# Patient Record
Sex: MALE | Race: WHITE | NOT HISPANIC OR LATINO | URBAN - METROPOLITAN AREA
[De-identification: names, ages, dates, MRNs, and addresses within clinical notes are randomized per-mention and may not be internally consistent; named-entity substitution may affect disease eponyms.]

---

## 2021-06-23 ENCOUNTER — OUTPATIENT (OUTPATIENT)
Dept: OUTPATIENT SERVICES | Facility: HOSPITAL | Age: 76
LOS: 1 days | End: 2021-06-23

## 2021-12-20 ENCOUNTER — OUTPATIENT (OUTPATIENT)
Dept: OUTPATIENT SERVICES | Facility: HOSPITAL | Age: 76
LOS: 1 days | End: 2021-12-20

## 2022-10-14 PROBLEM — Z00.00 ENCOUNTER FOR PREVENTIVE HEALTH EXAMINATION: Status: ACTIVE | Noted: 2022-10-14

## 2022-10-18 ENCOUNTER — NON-APPOINTMENT (OUTPATIENT)
Age: 77
End: 2022-10-18

## 2022-10-18 ENCOUNTER — APPOINTMENT (OUTPATIENT)
Dept: CARDIOLOGY | Facility: CLINIC | Age: 77
End: 2022-10-18
Payer: MEDICARE

## 2022-10-18 VITALS
WEIGHT: 198 LBS | OXYGEN SATURATION: 98 % | DIASTOLIC BLOOD PRESSURE: 56 MMHG | BODY MASS INDEX: 29.33 KG/M2 | SYSTOLIC BLOOD PRESSURE: 176 MMHG | HEART RATE: 56 BPM | TEMPERATURE: 98.4 F | HEIGHT: 69 IN

## 2022-10-18 VITALS — SYSTOLIC BLOOD PRESSURE: 164 MMHG | DIASTOLIC BLOOD PRESSURE: 60 MMHG

## 2022-10-18 DIAGNOSIS — Z87.898 PERSONAL HISTORY OF OTHER SPECIFIED CONDITIONS: ICD-10-CM

## 2022-10-18 DIAGNOSIS — Z78.9 OTHER SPECIFIED HEALTH STATUS: ICD-10-CM

## 2022-10-18 DIAGNOSIS — Z87.891 PERSONAL HISTORY OF NICOTINE DEPENDENCE: ICD-10-CM

## 2022-10-18 DIAGNOSIS — Z80.42 FAMILY HISTORY OF MALIGNANT NEOPLASM OF PROSTATE: ICD-10-CM

## 2022-10-18 DIAGNOSIS — F41.9 ANXIETY DISORDER, UNSPECIFIED: ICD-10-CM

## 2022-10-18 PROCEDURE — 99204 OFFICE O/P NEW MOD 45 MIN: CPT | Mod: 25

## 2022-10-18 PROCEDURE — 93242 EXT ECG>48HR<7D RECORDING: CPT | Mod: 59

## 2022-10-18 PROCEDURE — 93000 ELECTROCARDIOGRAM COMPLETE: CPT | Mod: 59

## 2022-10-28 ENCOUNTER — APPOINTMENT (OUTPATIENT)
Dept: CARDIOLOGY | Facility: CLINIC | Age: 77
End: 2022-10-28

## 2022-10-28 PROCEDURE — 93244 EXT ECG>48HR<7D REV&INTERPJ: CPT

## 2022-10-28 NOTE — HISTORY OF PRESENT ILLNESS
[FreeTextEntry1] : ESTRELLITA THOMAS  is a 77 year old  M\par \par Initial cardiology consultation. Referred by primary physician.\par Underwent remote evaluation for heart murmur. \par Now retired on the [employer]. \par \par History of longstanding hypertension. Recently his blood pressure has been more elevated. Losartan was added. When his blood pressure is elevated he feels warm with throbbing headache. He has had minor symptoms of edema on amlodipine. There is a reactive component to his blood pressure. At home his blood pressure is well controlled. \par There is a history of vasovagal syncope. \par He reports his heart rate is always low. Typically 40s.\par \par There is no prior history of a clinical myocardial infarction, coronary revascularization. \par There is no history of symptomatic congestive heart failure\par There is no history of symptomatic arrhythmias including atrial fibrillation.\par \par There is no exertional chest pain, pressure or discomfort. \par There is no significant dyspnea on exertion or orthopnea. \par There are no symptomatic palpitations, lightheadedness, dizziness or syncope.\par \par  HDL 39 creatinine 1.0 hemoglobin 15.7 \par

## 2022-10-28 NOTE — ASSESSMENT
[FreeTextEntry1] : Heart murmur. Hypertension. Bradycardia. \par Recommend echocardiogram, exercise test, monitor, carotid. \par Reviewed long-term blood pressure goals.\par  Return to office with blood pressure monitor & log. \par Reviewed indications for permanent pacemaker. \par Low-sodium diet. \par Long-term would benefit from lipid-lowering therapy. \par

## 2022-11-07 ENCOUNTER — NON-APPOINTMENT (OUTPATIENT)
Age: 77
End: 2022-11-07

## 2022-11-07 ENCOUNTER — APPOINTMENT (OUTPATIENT)
Dept: CARDIOLOGY | Facility: CLINIC | Age: 77
End: 2022-11-07

## 2022-11-07 PROCEDURE — 93979 VASCULAR STUDY: CPT

## 2022-11-07 PROCEDURE — 93306 TTE W/DOPPLER COMPLETE: CPT

## 2022-11-07 PROCEDURE — 93880 EXTRACRANIAL BILAT STUDY: CPT

## 2022-11-10 ENCOUNTER — APPOINTMENT (OUTPATIENT)
Dept: CARDIOLOGY | Facility: CLINIC | Age: 77
End: 2022-11-10

## 2022-11-10 PROCEDURE — 93015 CV STRESS TEST SUPVJ I&R: CPT

## 2022-11-15 ENCOUNTER — APPOINTMENT (OUTPATIENT)
Dept: CARDIOLOGY | Facility: CLINIC | Age: 77
End: 2022-11-15

## 2022-11-15 ENCOUNTER — RESULT REVIEW (OUTPATIENT)
Age: 77
End: 2022-11-15

## 2022-11-15 VITALS
SYSTOLIC BLOOD PRESSURE: 136 MMHG | HEART RATE: 57 BPM | HEIGHT: 69 IN | DIASTOLIC BLOOD PRESSURE: 68 MMHG | TEMPERATURE: 98 F | WEIGHT: 198 LBS | OXYGEN SATURATION: 98 % | BODY MASS INDEX: 29.33 KG/M2

## 2022-11-15 DIAGNOSIS — R01.1 CARDIAC MURMUR, UNSPECIFIED: ICD-10-CM

## 2022-11-15 DIAGNOSIS — E04.1 NONTOXIC SINGLE THYROID NODULE: ICD-10-CM

## 2022-11-15 DIAGNOSIS — R55 SYNCOPE AND COLLAPSE: ICD-10-CM

## 2022-11-15 PROCEDURE — 99214 OFFICE O/P EST MOD 30 MIN: CPT

## 2022-11-15 NOTE — HISTORY OF PRESENT ILLNESS
[FreeTextEntry1] : ESTRELLITA THOMAS  is a 77 year old  M\par \par Referred by primary physician.\par Underwent remote evaluation for heart murmur. \par Now retired on the [employer] [place]. Previously lived in [place]. \par Goes boating, fishes on beach has good QOL\par \par History of longstanding hypertension. Recently his blood pressure has been more elevated. Losartan was added. When his blood pressure is elevated he feels warm with throbbing headache. He has had minor symptoms of edema on amlodipine. There is a reactive component to his blood pressure. At home his blood pressure is well controlled. \par There is a history of vasovagal syncope. \par He reports his heart rate is always low. Typically 40s.\par \par There is no prior history of a clinical myocardial infarction, coronary revascularization. \par There is no history of symptomatic congestive heart failure\par There is no history of symptomatic arrhythmias including atrial fibrillation.\par \par There is no exertional chest pain, pressure or discomfort. \par There is no significant dyspnea on exertion or orthopnea. \par There are no symptomatic palpitations, lightheadedness, dizziness or syncope.\par \par Home BPs 110s/60s. Takes losartan and amlodipine in AM and feels very fatigued after. \par Reports longstanding white coat HTN\par \par October 2022  HDL 39 creatinine 1.0 hemoglobin 15.7 \par Abdominal ultrasound 11/2022 dilated proximal aorta and moderate plaque, max diameter 3 cm\par Carotid Doppler 11/2022 Mild to moderate nonobstructive atherosclerosis bilaterally.  Left thyroid nodule. \par Exercise stress test 6 minutes 40 seconds resting /78, peak /70. 6min 40sec. No ischemia by EKG. \par Echo 11/2022 normal LV systolic function trace MR\par Event monitor SB avg 53 bpm nonsustained ectopy noted\par

## 2022-11-15 NOTE — ASSESSMENT
[FreeTextEntry1] : ESTRELLITA THOMAS is a 77 year old M who presents today Nov 15, 2022 with the above history and the following active issues: \par \par Heart murmur. \par Trace MR, normal EF. \par Surveillance monitoring \par \par HTN\par Well controlled at home. Note reactive, white coat HTN. \par No HHD on echo\par Cont losartan 100mg in AM and switch amlodpine 5mg to nighttime given fatigue. \par Low-sodium diet. \par \par Longstanding bradycardia\par Hx vasovagal syncope\par SB on monitor avg 50s will avoid AVN blockers\par Reviewed red flag symptoms which would warrant emergent medical evaluation. \par \par Mild to mod carotid and ao atherosclerosis \par AAA 3cm\par BP control as above. \par Long-term would benefit from lipid-lowering therapy. \par \par HLD\par Start atrova 20mg daily along with diet and lifestyle modification measures\par Check labs in 2-3 mo. \par Call if AEs\par \par No ischemic heart disease on ETT\par Risk factor modification, prevention of CVD complications emphasized. \par Any questions and concerns were addressed and resolved. \par \par Sincerely,\par \par IJEOMA Hernandez\par Patients history, testing, and plan reviewed with supervising MD: Dr. Rudolph Soni \par \par \par \par Sincerely,\par \par FLOYD HernandezC\par Patients history, testing, and plan reviewed with supervising MD: Dr. Rudolph Soni

## 2022-11-15 NOTE — ADDENDUM
[FreeTextEntry1] : Please note the patient was reviewed with NP Chari Treviño.\par I was physically present during the service of the patient.\par I was directly involved in the management plan and recommendations of the care provided to the patient. \par I personally reviewed the history and physical examination as documented by the NP above.\par \par

## 2022-11-17 ENCOUNTER — APPOINTMENT (OUTPATIENT)
Dept: ULTRASOUND IMAGING | Facility: CLINIC | Age: 77
End: 2022-11-17

## 2022-11-17 PROCEDURE — 76536 US EXAM OF HEAD AND NECK: CPT

## 2022-11-21 ENCOUNTER — NON-APPOINTMENT (OUTPATIENT)
Age: 77
End: 2022-11-21

## 2023-02-07 ENCOUNTER — APPOINTMENT (OUTPATIENT)
Dept: CARDIOLOGY | Facility: CLINIC | Age: 78
End: 2023-02-07
Payer: MEDICARE

## 2023-02-07 VITALS
OXYGEN SATURATION: 98 % | SYSTOLIC BLOOD PRESSURE: 146 MMHG | BODY MASS INDEX: 28.29 KG/M2 | HEART RATE: 59 BPM | TEMPERATURE: 98.2 F | WEIGHT: 191 LBS | HEIGHT: 69 IN | DIASTOLIC BLOOD PRESSURE: 66 MMHG

## 2023-02-07 PROCEDURE — 99214 OFFICE O/P EST MOD 30 MIN: CPT

## 2023-02-07 NOTE — ASSESSMENT
[FreeTextEntry1] : ESTRELLITA THOMAS is a 77 year old M who presents today Nov 15, 2022 with the above history and the following active issues: \par \par Heart murmur. \par Trace MR, normal EF. \par Surveillance monitoring \par \par HTN\par There is reactive, white coat HTN. \par No HHD on echo\par There are SBP readings <100 and fatigue since pt has intentionally lost weight. \par Cont losartan 100mg in AM and  may reduce amlodipine 2.5mg to nighttime\par If cont weight loss/fatigue he will call me to further discuss. Cont home monitoring. \par Low-sodium diet. \par \par Longstanding bradycardia\par Hx vasovagal syncope\par SB on monitor avg 50s will avoid AVN blockers\par Reviewed red flag symptoms which would warrant emergent medical evaluation. \par \par Mild to mod carotid and ao atherosclerosis \par AAA 3cm\par BP control as above. \par Long-term would benefit from lipid-lowering therapy. \par \par HLD\par Doing well on atorva 20mg daily along with diet and lifestyle modification measures\par LDL from 124 to 51, no AEs so cont current dosing\par \par No ischemic heart disease on ETT\par Risk factor modification, prevention of CVD complications emphasized. \par Any questions and concerns were addressed and resolved. \par \par Sincerely,\par \par IJEOMA Montano\par Patients history, testing, and plan reviewed with supervising MD: Dr. Rudolph Soni

## 2023-02-07 NOTE — ADDENDUM
[FreeTextEntry1] : Please note the patient was reviewed with NP Chari [place].\par I was physically present during the service of the patient.\par I was directly involved in the management plan and recommendations of the care provided to the patient. \par I personally reviewed the history and physical examination as documented by the NP above.\par \par

## 2023-02-07 NOTE — HISTORY OF PRESENT ILLNESS
[FreeTextEntry1] : ESTRELLITA THOMAS  is a 77 year old  M\par \par Referred by primary physician.\par Underwent remote evaluation for heart murmur. \par Now retired on the [employer] [place]. Previously lived in [place]. \par Goes boating, fishes on beach has good QOL\par \par History of longstanding hypertension. Recently his blood pressure has been more elevated. Losartan was added. When his blood pressure is elevated he feels warm with throbbing headache. He has had minor symptoms of edema on amlodipine. There is a reactive component to his blood pressure. At home his blood pressure is well controlled. \par There is a history of vasovagal syncope. \par He reports his heart rate is always low. Typically 40s.\par \par There is no prior history of a clinical myocardial infarction, coronary revascularization. \par There is no history of symptomatic congestive heart failure\par There is no history of symptomatic arrhythmias including atrial fibrillation.\par \par There is no exertional chest pain, pressure or discomfort. \par There is no significant dyspnea on exertion or orthopnea. \par There are no symptomatic palpitations, lightheadedness, dizziness or syncope.\par \par Home BPs 110s/60s. However there are some readings SBP <100. Takes losartan AM and norvasc in PM. He feels very fatigued lately. He intentionally lost weight, about 7 lbs. \par Reports longstanding white coat HTN\par \par October 2022  HDL 39 creatinine 1.0 hemoglobin 15.7 started on statin --> \par Labs 2/2023 LDL 51, LFTs wnl, k 5, cr 1.1\par \par Abdominal ultrasound 11/2022 dilated proximal aorta and moderate plaque, max diameter 3 cm\par Carotid Doppler 11/2022 Mild to moderate nonobstructive atherosclerosis bilaterally.  Left thyroid nodule. \par Exercise stress test 6 minutes 40 seconds resting /78, peak /70. 6min 40sec. No ischemia by EKG. \par Echo 11/2022 normal LV systolic function trace MR\par Event monitor SB avg 53 bpm nonsustained ectopy noted\par thyroid US scanned shows some very small nodules/cysts which are not suspicious

## 2023-10-10 ENCOUNTER — NON-APPOINTMENT (OUTPATIENT)
Age: 78
End: 2023-10-10

## 2023-10-10 ENCOUNTER — APPOINTMENT (OUTPATIENT)
Dept: CARDIOLOGY | Facility: CLINIC | Age: 78
End: 2023-10-10
Payer: MEDICARE

## 2023-10-10 VITALS
HEART RATE: 58 BPM | HEIGHT: 69 IN | OXYGEN SATURATION: 97 % | BODY MASS INDEX: 27.85 KG/M2 | DIASTOLIC BLOOD PRESSURE: 88 MMHG | SYSTOLIC BLOOD PRESSURE: 178 MMHG | WEIGHT: 188 LBS

## 2023-10-10 LAB — LDLC SERPL DIRECT ASSAY-MCNC: 51

## 2023-10-10 PROCEDURE — 99214 OFFICE O/P EST MOD 30 MIN: CPT

## 2023-10-10 RX ORDER — ATORVASTATIN CALCIUM 20 MG/1
20 TABLET, FILM COATED ORAL
Qty: 90 | Refills: 3 | Status: DISCONTINUED | COMMUNITY
Start: 2022-11-15 | End: 2023-10-10

## 2023-10-11 RX ORDER — AMLODIPINE BESYLATE 2.5 MG/1
2.5 TABLET ORAL DAILY
Qty: 90 | Refills: 3 | Status: ACTIVE | COMMUNITY
Start: 2022-09-06 | End: 1900-01-01

## 2023-10-20 ENCOUNTER — TRANSCRIPTION ENCOUNTER (OUTPATIENT)
Age: 78
End: 2023-10-20

## 2023-11-27 LAB — HBA1C MFR BLD HPLC: 5.3

## 2023-11-28 ENCOUNTER — APPOINTMENT (OUTPATIENT)
Dept: CARDIOLOGY | Facility: CLINIC | Age: 78
End: 2023-11-28
Payer: MEDICARE

## 2023-11-28 VITALS
DIASTOLIC BLOOD PRESSURE: 72 MMHG | HEIGHT: 69 IN | WEIGHT: 186 LBS | SYSTOLIC BLOOD PRESSURE: 150 MMHG | HEART RATE: 52 BPM | OXYGEN SATURATION: 99 % | BODY MASS INDEX: 27.55 KG/M2

## 2023-11-28 DIAGNOSIS — I10 ESSENTIAL (PRIMARY) HYPERTENSION: ICD-10-CM

## 2023-11-28 DIAGNOSIS — I49.1 ATRIAL PREMATURE DEPOLARIZATION: ICD-10-CM

## 2023-11-28 DIAGNOSIS — R00.1 BRADYCARDIA, UNSPECIFIED: ICD-10-CM

## 2023-11-28 DIAGNOSIS — I71.40 ABDOMINAL AORTIC ANEURYSM, WITHOUT RUPTURE, UNSPECIFIED: ICD-10-CM

## 2023-11-28 DIAGNOSIS — E78.2 MIXED HYPERLIPIDEMIA: ICD-10-CM

## 2023-11-28 DIAGNOSIS — I65.23 OCCLUSION AND STENOSIS OF BILATERAL CAROTID ARTERIES: ICD-10-CM

## 2023-11-28 PROCEDURE — 93979 VASCULAR STUDY: CPT

## 2023-11-28 PROCEDURE — 99214 OFFICE O/P EST MOD 30 MIN: CPT

## 2023-11-28 RX ORDER — ALPRAZOLAM 0.25 MG/1
0.25 TABLET ORAL DAILY
Refills: 0 | Status: DISCONTINUED | COMMUNITY
End: 2023-11-28

## 2023-12-07 ENCOUNTER — RX RENEWAL (OUTPATIENT)
Age: 78
End: 2023-12-07

## 2023-12-08 RX ORDER — LOSARTAN POTASSIUM 50 MG/1
50 TABLET, FILM COATED ORAL
Qty: 90 | Refills: 3 | Status: ACTIVE | COMMUNITY
Start: 2022-10-13 | End: 1900-01-01

## 2024-09-24 ENCOUNTER — NON-APPOINTMENT (OUTPATIENT)
Age: 79
End: 2024-09-24

## 2024-09-24 ENCOUNTER — APPOINTMENT (OUTPATIENT)
Dept: CARDIOLOGY | Facility: CLINIC | Age: 79
End: 2024-09-24
Payer: MEDICARE

## 2024-09-24 VITALS
WEIGHT: 190 LBS | BODY MASS INDEX: 28.06 KG/M2 | DIASTOLIC BLOOD PRESSURE: 82 MMHG | OXYGEN SATURATION: 96 % | SYSTOLIC BLOOD PRESSURE: 110 MMHG | HEART RATE: 49 BPM

## 2024-09-24 DIAGNOSIS — R01.1 CARDIAC MURMUR, UNSPECIFIED: ICD-10-CM

## 2024-09-24 DIAGNOSIS — R00.1 BRADYCARDIA, UNSPECIFIED: ICD-10-CM

## 2024-09-24 DIAGNOSIS — I71.40 ABDOMINAL AORTIC ANEURYSM, WITHOUT RUPTURE, UNSPECIFIED: ICD-10-CM

## 2024-09-24 DIAGNOSIS — I65.23 OCCLUSION AND STENOSIS OF BILATERAL CAROTID ARTERIES: ICD-10-CM

## 2024-09-24 DIAGNOSIS — I49.1 ATRIAL PREMATURE DEPOLARIZATION: ICD-10-CM

## 2024-09-24 DIAGNOSIS — E78.2 MIXED HYPERLIPIDEMIA: ICD-10-CM

## 2024-09-24 DIAGNOSIS — I10 ESSENTIAL (PRIMARY) HYPERTENSION: ICD-10-CM

## 2024-09-24 DIAGNOSIS — I25.10 ATHEROSCLEROTIC HEART DISEASE OF NATIVE CORONARY ARTERY W/OUT ANGINA PECTORIS: ICD-10-CM

## 2024-09-24 PROCEDURE — 93246 EXT ECG>7D<15D RECORDING: CPT

## 2024-09-24 PROCEDURE — 93000 ELECTROCARDIOGRAM COMPLETE: CPT | Mod: 59

## 2024-09-24 PROCEDURE — 99214 OFFICE O/P EST MOD 30 MIN: CPT

## 2024-09-25 NOTE — HISTORY OF PRESENT ILLNESS
[FreeTextEntry1] : ESTRELLITA THOMAS  is a 78 year old  M  Referred by primary physician d/t low HR Underwent remote evaluation for heart murmur.  Now retired on the [place] [place]. Previously lived in [place].  Goes boating, fishes on beach has good QOL  History of longstanding hypertension. Recently his blood pressure has been more elevated. Losartan was added. When his blood pressure is elevated he feels warm with throbbing headache. He has had minor symptoms of edema on amlodipine. There is a reactive component to his blood pressure. At home his blood pressure is well controlled.  There is a history of vasovagal syncope.  He reports his heart rate is always low. Typically 40s.  There is no prior history of a clinical myocardial infarction, coronary revascularization.  There is no history of symptomatic congestive heart failure There is no history of symptomatic arrhythmias including atrial fibrillation.  There is no exertional chest pain, pressure or discomfort.  There is no significant dyspnea on exertion or orthopnea.  There are no symptomatic palpitations, lightheadedness, dizziness or syncope.  there is a history of baseline bradycardia.   There is a history of reactive hypertension.  His home blood pressures are within normal limits.    He felt unwell on the statin.  He does not recall the symptoms.  He stopped taking the medication in May 2023 His LDL off med is 73, but has low HDL ~30s  Most recently underwent THR with improved functional status. He fishes on the [facility] and walked up 105 steps to from the beach without exertional complaints.  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  Left thyroid nodule.  Exercise stress test 6 minutes 40 seconds resting /78, peak /70. 6min 40sec. No ischemia by EKG.  Echo 11/2022 normal LV systolic function trace MR Event monitor SB avg 53 bpm nonsustained ectopy noted thyroid US scanned shows some very small nodules/cysts which are not suspicious

## 2024-09-25 NOTE — HISTORY OF PRESENT ILLNESS
[FreeTextEntry1] : ESTRELLITA THOMAS  is a 78 year old  M  Referred by primary physician d/t low HR Underwent remote evaluation for heart murmur.  Now retired on the [place] [employer] [place]. Previously lived in [place].  Goes boating, fishes on beach has good QOL  History of longstanding hypertension. Recently his blood pressure has been more elevated. Losartan was added. When his blood pressure is elevated he feels warm with throbbing headache. He has had minor symptoms of edema on amlodipine. There is a reactive component to his blood pressure. At home his blood pressure is well controlled.  There is a history of vasovagal syncope.  He reports his heart rate is always low. Typically 40s.  There is no prior history of a clinical myocardial infarction, coronary revascularization.  There is no history of symptomatic congestive heart failure There is no history of symptomatic arrhythmias including atrial fibrillation.  There is no exertional chest pain, pressure or discomfort.  There is no significant dyspnea on exertion or orthopnea.  There are no symptomatic palpitations, lightheadedness, dizziness or syncope.  there is a history of baseline bradycardia.   There is a history of reactive hypertension.  His home blood pressures are within normal limits.    He felt unwell on the statin.  He does not recall the symptoms.  He stopped taking the medication in May 2023 His LDL off med is 73, but has low HDL ~30s  Most recently underwent THR with improved functional status. He fishes on the [facility] and walked up 105 steps to from the beach without exertional complaints.  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  Left thyroid nodule.  Exercise stress test 6 minutes 40 seconds resting /78, peak /70. 6min 40sec. No ischemia by EKG.  Echo 11/2022 normal LV systolic function trace MR Event monitor SB avg 53 bpm nonsustained ectopy noted thyroid US scanned shows some very small nodules/cysts which are not suspicious

## 2024-09-25 NOTE — ASSESSMENT
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 Trace MR, normal EF. Surveillance monitoring  HTN There is reactive, white coat HTN. No HHD on echo Cont losartan 100mg in AM and amlodipine 2.5mg to nighttime home monitoring shows BP at goal <130/80 Low-sodium diet.  Longstanding bradycardia Hx vasovagal syncope avoid AVN blockers  Mild to mod carotid and ao atherosclerosis AAA 3cm on prior not seen on more recent US  BP control as above. Long-term would benefit from lipid-lowering therapy.  HLD low HDL side effects on lowest possible dose of atorva 5-10mg declines further lipid lowering rx at this time repeat labs diet and lifestyle modification measures  Followup after labs/monitor.  If any symptoms occur he is aware to call us right away for further eval.   Sincerely,  IJEOMA Montano Patients history, testing, and plan reviewed with supervising MD: Dr. Rudolph Soni

## 2024-09-25 NOTE — ASSESSMENT
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 Trace MR, normal EF. Surveillance monitoring  HTN There is reactive, white coat HTN. No HHD on echo Cont losartan 100mg in AM and amlodipine 2.5mg to nighttime home monitoring shows BP at goal <130/80 Low-sodium diet.  Longstanding bradycardia Hx vasovagal syncope avoid AVN blockers  Mild to mod carotid and ao atherosclerosis AAA 3cm on prior not seen on more recent US  BP control as above. Long-term would benefit from lipid-lowering therapy.  HLD low HDL side effects on lowest possible dose of atorva 5-10mg declines further lipid lowering rx at this time repeat labs diet and lifestyle modification measures  Followup after labs/monitor.  If any symptoms occur he is aware to call us right away for further eval.   Sincerely,  IJEOMA Montano Patients history, testing, and plan reviewed with supervising MD: Dr. Rudolph Soni

## 2024-10-09 DIAGNOSIS — I48.0 PAROXYSMAL ATRIAL FIBRILLATION: ICD-10-CM

## 2024-10-09 PROCEDURE — 93248 EXT ECG>7D<15D REV&INTERPJ: CPT

## 2024-10-15 RX ORDER — APIXABAN 5 MG/1
5 TABLET, FILM COATED ORAL
Qty: 180 | Refills: 1 | Status: DISCONTINUED | COMMUNITY
Start: 2024-10-09 | End: 2024-10-15

## 2024-10-15 RX ORDER — RIVAROXABAN 20 MG/1
20 TABLET, FILM COATED ORAL
Qty: 90 | Refills: 1 | Status: ACTIVE | COMMUNITY
Start: 2024-10-15 | End: 1900-01-01

## 2024-11-06 ENCOUNTER — APPOINTMENT (OUTPATIENT)
Dept: ELECTROPHYSIOLOGY | Facility: CLINIC | Age: 79
End: 2024-11-06

## 2024-11-06 VITALS
HEART RATE: 40 BPM | BODY MASS INDEX: 27.25 KG/M2 | WEIGHT: 184 LBS | SYSTOLIC BLOOD PRESSURE: 154 MMHG | HEIGHT: 69 IN | DIASTOLIC BLOOD PRESSURE: 62 MMHG | OXYGEN SATURATION: 96 %

## 2024-11-06 DIAGNOSIS — R00.1 BRADYCARDIA, UNSPECIFIED: ICD-10-CM

## 2024-11-06 DIAGNOSIS — I48.0 PAROXYSMAL ATRIAL FIBRILLATION: ICD-10-CM

## 2024-11-06 PROCEDURE — 93242 EXT ECG>48HR<7D RECORDING: CPT

## 2024-11-06 PROCEDURE — 99214 OFFICE O/P EST MOD 30 MIN: CPT

## 2024-11-06 PROCEDURE — 93000 ELECTROCARDIOGRAM COMPLETE: CPT

## 2024-11-06 RX ORDER — APIXABAN 5 MG/1
5 TABLET, FILM COATED ORAL
Refills: 0 | Status: ACTIVE | COMMUNITY

## 2024-11-13 ENCOUNTER — NON-APPOINTMENT (OUTPATIENT)
Age: 79
End: 2024-11-13

## 2024-12-03 ENCOUNTER — APPOINTMENT (OUTPATIENT)
Dept: CARDIOLOGY | Facility: CLINIC | Age: 79
End: 2024-12-03
Payer: MEDICARE

## 2024-12-03 VITALS
WEIGHT: 183 LBS | DIASTOLIC BLOOD PRESSURE: 60 MMHG | OXYGEN SATURATION: 99 % | HEIGHT: 69 IN | SYSTOLIC BLOOD PRESSURE: 140 MMHG | HEART RATE: 49 BPM | BODY MASS INDEX: 27.11 KG/M2

## 2024-12-03 DIAGNOSIS — I48.0 PAROXYSMAL ATRIAL FIBRILLATION: ICD-10-CM

## 2024-12-03 DIAGNOSIS — I25.10 ATHEROSCLEROTIC HEART DISEASE OF NATIVE CORONARY ARTERY W/OUT ANGINA PECTORIS: ICD-10-CM

## 2024-12-03 DIAGNOSIS — I49.1 ATRIAL PREMATURE DEPOLARIZATION: ICD-10-CM

## 2024-12-03 DIAGNOSIS — I10 ESSENTIAL (PRIMARY) HYPERTENSION: ICD-10-CM

## 2024-12-03 DIAGNOSIS — R00.1 BRADYCARDIA, UNSPECIFIED: ICD-10-CM

## 2024-12-03 DIAGNOSIS — E78.2 MIXED HYPERLIPIDEMIA: ICD-10-CM

## 2024-12-03 DIAGNOSIS — R01.1 CARDIAC MURMUR, UNSPECIFIED: ICD-10-CM

## 2024-12-03 DIAGNOSIS — I71.40 ABDOMINAL AORTIC ANEURYSM, WITHOUT RUPTURE, UNSPECIFIED: ICD-10-CM

## 2024-12-03 PROCEDURE — 99214 OFFICE O/P EST MOD 30 MIN: CPT

## 2024-12-03 RX ORDER — ALPRAZOLAM 2 MG/1
TABLET ORAL
Refills: 0 | Status: ACTIVE | COMMUNITY

## 2024-12-25 PROBLEM — F10.90 ALCOHOL USE: Status: ACTIVE | Noted: 2022-10-18

## 2025-01-22 PROCEDURE — 93244 EXT ECG>48HR<7D REV&INTERPJ: CPT

## 2025-01-31 ENCOUNTER — APPOINTMENT (OUTPATIENT)
Dept: CARDIOLOGY | Facility: CLINIC | Age: 80
End: 2025-01-31
Payer: MEDICARE

## 2025-01-31 VITALS
BODY MASS INDEX: 28.65 KG/M2 | OXYGEN SATURATION: 99 % | DIASTOLIC BLOOD PRESSURE: 82 MMHG | WEIGHT: 194 LBS | HEART RATE: 47 BPM | SYSTOLIC BLOOD PRESSURE: 162 MMHG

## 2025-01-31 DIAGNOSIS — I25.10 ATHEROSCLEROTIC HEART DISEASE OF NATIVE CORONARY ARTERY W/OUT ANGINA PECTORIS: ICD-10-CM

## 2025-01-31 DIAGNOSIS — I10 ESSENTIAL (PRIMARY) HYPERTENSION: ICD-10-CM

## 2025-01-31 DIAGNOSIS — I48.0 PAROXYSMAL ATRIAL FIBRILLATION: ICD-10-CM

## 2025-01-31 DIAGNOSIS — E78.2 MIXED HYPERLIPIDEMIA: ICD-10-CM

## 2025-01-31 DIAGNOSIS — R00.1 BRADYCARDIA, UNSPECIFIED: ICD-10-CM

## 2025-01-31 DIAGNOSIS — I71.40 ABDOMINAL AORTIC ANEURYSM, WITHOUT RUPTURE, UNSPECIFIED: ICD-10-CM

## 2025-01-31 DIAGNOSIS — R01.1 CARDIAC MURMUR, UNSPECIFIED: ICD-10-CM

## 2025-01-31 DIAGNOSIS — I65.23 OCCLUSION AND STENOSIS OF BILATERAL CAROTID ARTERIES: ICD-10-CM

## 2025-01-31 PROCEDURE — 99214 OFFICE O/P EST MOD 30 MIN: CPT

## 2025-01-31 PROCEDURE — 93306 TTE W/DOPPLER COMPLETE: CPT

## 2025-02-24 ENCOUNTER — APPOINTMENT (OUTPATIENT)
Dept: CARDIOLOGY | Facility: CLINIC | Age: 80
End: 2025-02-24

## 2025-03-04 ENCOUNTER — APPOINTMENT (OUTPATIENT)
Dept: CARDIOLOGY | Facility: CLINIC | Age: 80
End: 2025-03-04

## 2025-04-30 ENCOUNTER — APPOINTMENT (OUTPATIENT)
Dept: CARDIOLOGY | Facility: CLINIC | Age: 80
End: 2025-04-30